# Patient Record
Sex: MALE | Race: WHITE | NOT HISPANIC OR LATINO | Employment: OTHER | URBAN - METROPOLITAN AREA
[De-identification: names, ages, dates, MRNs, and addresses within clinical notes are randomized per-mention and may not be internally consistent; named-entity substitution may affect disease eponyms.]

---

## 2018-01-27 ENCOUNTER — HOSPITAL ENCOUNTER (EMERGENCY)
Facility: HOSPITAL | Age: 73
Discharge: HOME | End: 2018-01-27
Attending: EMERGENCY MEDICINE
Payer: MEDICARE

## 2018-01-27 VITALS
OXYGEN SATURATION: 96 % | RESPIRATION RATE: 16 BRPM | HEART RATE: 89 BPM | SYSTOLIC BLOOD PRESSURE: 184 MMHG | HEIGHT: 67 IN | BODY MASS INDEX: 25.11 KG/M2 | TEMPERATURE: 99 F | WEIGHT: 160 LBS | DIASTOLIC BLOOD PRESSURE: 88 MMHG

## 2018-01-27 DIAGNOSIS — R30.0 DYSURIA: ICD-10-CM

## 2018-01-27 DIAGNOSIS — N40.1 BENIGN PROSTATIC HYPERPLASIA WITH URINARY FREQUENCY: Primary | ICD-10-CM

## 2018-01-27 DIAGNOSIS — R35.0 BENIGN PROSTATIC HYPERPLASIA WITH URINARY FREQUENCY: Primary | ICD-10-CM

## 2018-01-27 LAB
BILIRUB UR QL STRIP: NEGATIVE
CLARITY UR: CLEAR
COLOR UR: YELLOW
GLUCOSE UR QL STRIP: NEGATIVE
HGB UR QL STRIP: ABNORMAL
KETONES UR QL STRIP: NEGATIVE
LEUKOCYTE ESTERASE UR QL STRIP: NEGATIVE
NITRITE UR QL STRIP: NEGATIVE
PH UR STRIP: 6 [PH] (ref 5–8)
PROT UR QL STRIP: ABNORMAL
SP GR UR STRIP: >=1.03 (ref 1–1.03)
URN SPEC COLLECT METH UR: ABNORMAL
UROBILINOGEN UR STRIP-ACNC: NEGATIVE EU/DL

## 2018-01-27 PROCEDURE — 81003 URINALYSIS AUTO W/O SCOPE: CPT

## 2018-01-27 PROCEDURE — 87086 URINE CULTURE/COLONY COUNT: CPT

## 2018-01-27 PROCEDURE — 99283 EMERGENCY DEPT VISIT LOW MDM: CPT

## 2018-01-27 RX ORDER — TAMSULOSIN HYDROCHLORIDE 0.4 MG/1
0.4 CAPSULE ORAL DAILY
Qty: 10 CAPSULE | Refills: 0 | Status: SHIPPED | OUTPATIENT
Start: 2018-01-27 | End: 2018-02-06

## 2018-01-28 NOTE — ED PROVIDER NOTES
Encounter Date: 1/27/2018    SCRIBE #1 NOTE: I, Paramjit Fung, am scribing for, and in the presence of, Dr. Elias.       History     Chief Complaint   Patient presents with    Dysuria     Increased frequency and urgency with associated pain        01/27/2018 8:29 PM     Chief complaint: Dysuria      Kb Link is a 72 y.o. male with a PMHx of BPH who presents to the ED c/o dysuria. He says he had some burning during urination this morning which has persisted during the day. He states he recently had a bladder infection. He denies having any blood in his urine, testicular pain, vomiting, rashes or  abdominal pain. He states he has an enlarged prostate. He reports a subjective fever this morning. He has not other medical problems. He has NKDA.      The history is provided by the patient.     Review of patient's allergies indicates:  No Known Allergies  Past Medical History:   Diagnosis Date    BPH (benign prostatic hyperplasia)      History reviewed. No pertinent surgical history.  History reviewed. No pertinent family history.  Social History   Substance Use Topics    Smoking status: Current Every Day Smoker     Types: Cigars    Smokeless tobacco: Never Used    Alcohol use Yes     Review of Systems   Constitutional: Positive for fever (subjective).   HENT: Negative for sore throat.    Respiratory: Negative for shortness of breath.    Cardiovascular: Negative for chest pain.   Gastrointestinal: Negative for abdominal pain, nausea and vomiting.   Genitourinary: Positive for dysuria. Negative for testicular pain.   Musculoskeletal: Negative for back pain.   Skin: Negative for rash.   Neurological: Negative for weakness.   Hematological: Does not bruise/bleed easily.       Physical Exam     Initial Vitals [01/27/18 2014]   BP Pulse Resp Temp SpO2   (!) 184/88 89 16 98.6 °F (37 °C) 96 %      MAP       120         Physical Exam    Nursing note and vitals reviewed.  Constitutional: He appears well-developed  and well-nourished. He is not diaphoretic. No distress.   HENT:   Head: Normocephalic and atraumatic.   Mouth/Throat: Oropharynx is clear and moist.   Eyes: Conjunctivae are normal.   Neck: Neck supple.   Cardiovascular: Normal rate, regular rhythm, normal heart sounds and intact distal pulses. Exam reveals no gallop and no friction rub.    No murmur heard.  Pulmonary/Chest: Breath sounds normal. He has no wheezes. He has no rhonchi. He has no rales.   Abdominal: Soft. He exhibits no distension. There is no tenderness.   No flank tenderness   Musculoskeletal: Normal range of motion.   Neurological: He is alert and oriented to person, place, and time.   Skin: No rash noted. No erythema.   Psychiatric: He has a normal mood and affect. His behavior is normal. Judgment and thought content normal.         ED Course   Procedures  Labs Reviewed   URINALYSIS - Abnormal; Notable for the following:        Result Value    Specific Gravity, UA >=1.030 (*)     Protein, UA Trace (*)     Occult Blood UA Trace (*)     All other components within normal limits   CULTURE, URINE             Medical Decision Making:   History:   Old Medical Records: I decided to obtain old medical records.  Clinical Tests:   Lab Tests: Ordered and Reviewed            Scribe Attestation:   Scribe #1: I performed the above scribed service and the documentation accurately describes the services I performed. I attest to the accuracy of the note.    I, Dr. eNri Elias, personally performed the services described in this documentation. All medical record entries made by the scribe were at my direction and in my presence.  I have reviewed the chart and agree that the record reflects my personal performance and is accurate and complete. Neri Elias MD.  3:12 AM 01/28/2018    Kb Link is a 72 y.o. male presenting with urinary symptoms of frequency and urgency with some slight dysuria with urination as well.  Urinalysis is not suggestive  of infection.  He has no fever, flank pain, or emesis.  I doubt pyelonephritis.  Less likely acute cystitis with symptoms related to BPH considered.  Urine culture sent.  Patient was initiated on Flomax for close urology follow-up recommended.  I do not think other labs are indicated.  Very low suspicion for urinary retention.  No abdominal distention or tenderness to palpation here.  Return precautions reviewed.        ED Course      Clinical Impression:   The primary encounter diagnosis was Benign prostatic hyperplasia with urinary frequency. A diagnosis of Dysuria was also pertinent to this visit.                           Neri Elias MD  01/28/18 0313

## 2018-01-30 LAB — BACTERIA UR CULT: NORMAL

## 2018-10-25 ENCOUNTER — APPOINTMENT (EMERGENCY)
Dept: RADIOLOGY | Facility: HOSPITAL | Age: 73
End: 2018-10-25
Attending: EMERGENCY MEDICINE
Payer: MEDICARE

## 2018-10-25 ENCOUNTER — HOSPITAL ENCOUNTER (EMERGENCY)
Facility: HOSPITAL | Age: 73
Discharge: HOME | End: 2018-10-25
Attending: EMERGENCY MEDICINE
Payer: MEDICARE

## 2018-10-25 VITALS
BODY MASS INDEX: 25.11 KG/M2 | TEMPERATURE: 98.4 F | OXYGEN SATURATION: 96 % | HEART RATE: 69 BPM | DIASTOLIC BLOOD PRESSURE: 75 MMHG | RESPIRATION RATE: 16 BRPM | WEIGHT: 160 LBS | HEIGHT: 67 IN | SYSTOLIC BLOOD PRESSURE: 138 MMHG

## 2018-10-25 DIAGNOSIS — N30.01 ACUTE CYSTITIS WITH HEMATURIA: Primary | ICD-10-CM

## 2018-10-25 LAB
ANION GAP SERPL CALC-SCNC: 9 MEQ/L (ref 3–15)
BACTERIA URNS QL MICRO: ABNORMAL /HPF
BASOPHILS # BLD: 0.04 K/UL (ref 0.01–0.1)
BASOPHILS NFR BLD: 0.5 %
BILIRUB UR QL STRIP.AUTO: ABNORMAL MG/DL
BUN SERPL-MCNC: 28 MG/DL (ref 8–20)
CALCIUM SERPL-MCNC: 9.2 MG/DL (ref 8.9–10.3)
CHLORIDE SERPL-SCNC: 107 MEQ/L (ref 98–109)
CLARITY UR REFRACT.AUTO: ABNORMAL
CO2 SERPL-SCNC: 25 MEQ/L (ref 22–32)
COLOR UR AUTO: ABNORMAL
CREAT SERPL-MCNC: 1 MG/DL (ref 0.8–1.3)
DIFFERENTIAL METHOD BLD: ABNORMAL
EOSINOPHIL # BLD: 0.08 K/UL (ref 0.04–0.54)
EOSINOPHIL NFR BLD: 0.9 %
ERYTHROCYTE [DISTWIDTH] IN BLOOD BY AUTOMATED COUNT: 12.4 % (ref 11.6–14.4)
GFR SERPL CREATININE-BSD FRML MDRD: >60 ML/MIN/1.73M*2
GLUCOSE SERPL-MCNC: 107 MG/DL (ref 70–99)
GLUCOSE UR STRIP.AUTO-MCNC: ABNORMAL MG/DL
HCT VFR BLDCO AUTO: 38.8 % (ref 40.1–51)
HGB BLD-MCNC: 13 G/DL (ref 13.7–17.5)
HGB UR QL STRIP.AUTO: 3
HYALINE CASTS #/AREA URNS LPF: ABNORMAL /LPF
IMM GRANULOCYTES # BLD AUTO: 0.03 K/UL (ref 0–0.08)
IMM GRANULOCYTES NFR BLD AUTO: 0.3 %
KETONES UR STRIP.AUTO-MCNC: ABNORMAL MG/DL
LEUKOCYTE ESTERASE UR QL STRIP.AUTO: 2
LYMPHOCYTES # BLD: 1.46 K/UL (ref 1.2–3.5)
LYMPHOCYTES NFR BLD: 16.8 %
MCH RBC QN AUTO: 31.3 PG (ref 28–33.2)
MCHC RBC AUTO-ENTMCNC: 33.5 G/DL (ref 32.2–36.5)
MCV RBC AUTO: 93.3 FL (ref 83–98)
MONOCYTES # BLD: 0.56 K/UL (ref 0.3–1)
MONOCYTES NFR BLD: 6.4 %
NEUTROPHILS # BLD: 6.54 K/UL (ref 1.7–7)
NEUTS SEG NFR BLD: 75.1 %
NITRITE UR QL STRIP.AUTO: ABNORMAL
NRBC BLD-RTO: 0.1 %
PDW BLD AUTO: 8.3 FL (ref 9.4–12.4)
PH UR STRIP.AUTO: ABNORMAL [PH]
PLATELET # BLD AUTO: 378 K/UL (ref 150–350)
POTASSIUM SERPL-SCNC: 3.7 MEQ/L (ref 3.6–5.1)
PROT UR QL STRIP.AUTO: ABNORMAL
RBC # BLD AUTO: 4.16 M/UL (ref 4.5–5.8)
RBC #/AREA URNS HPF: ABNORMAL /HPF
SODIUM SERPL-SCNC: 141 MEQ/L (ref 136–144)
SP GR UR REFRACT.AUTO: 1.02
SQUAMOUS URNS QL MICRO: 1 /HPF
UROBILINOGEN UR STRIP-ACNC: ABNORMAL EU/DL
WBC # BLD AUTO: 8.71 K/UL (ref 3.8–10.5)
WBC #/AREA URNS HPF: ABNORMAL /HPF

## 2018-10-25 PROCEDURE — 96365 THER/PROPH/DIAG IV INF INIT: CPT

## 2018-10-25 PROCEDURE — 85025 COMPLETE CBC W/AUTO DIFF WBC: CPT | Performed by: PHYSICIAN ASSISTANT

## 2018-10-25 PROCEDURE — 96361 HYDRATE IV INFUSION ADD-ON: CPT

## 2018-10-25 PROCEDURE — 81003 URINALYSIS AUTO W/O SCOPE: CPT | Performed by: PHYSICIAN ASSISTANT

## 2018-10-25 PROCEDURE — 87040 BLOOD CULTURE FOR BACTERIA: CPT | Performed by: PHYSICIAN ASSISTANT

## 2018-10-25 PROCEDURE — 63600000 HC DRUGS/DETAIL CODE: Performed by: PHYSICIAN ASSISTANT

## 2018-10-25 PROCEDURE — 3E0337Z INTRODUCTION OF ELECTROLYTIC AND WATER BALANCE SUBSTANCE INTO PERIPHERAL VEIN, PERCUTANEOUS APPROACH: ICD-10-PCS | Performed by: EMERGENCY MEDICINE

## 2018-10-25 PROCEDURE — 74176 CT ABD & PELVIS W/O CONTRAST: CPT

## 2018-10-25 PROCEDURE — 80048 BASIC METABOLIC PNL TOTAL CA: CPT | Mod: 59 | Performed by: PHYSICIAN ASSISTANT

## 2018-10-25 PROCEDURE — 25800000 HC PHARMACY IV SOLUTIONS: Performed by: EMERGENCY MEDICINE

## 2018-10-25 PROCEDURE — 36415 COLL VENOUS BLD VENIPUNCTURE: CPT | Performed by: PHYSICIAN ASSISTANT

## 2018-10-25 PROCEDURE — 99284 EMERGENCY DEPT VISIT MOD MDM: CPT | Mod: 25

## 2018-10-25 PROCEDURE — 87086 URINE CULTURE/COLONY COUNT: CPT | Performed by: PHYSICIAN ASSISTANT

## 2018-10-25 PROCEDURE — 3E03329 INTRODUCTION OF OTHER ANTI-INFECTIVE INTO PERIPHERAL VEIN, PERCUTANEOUS APPROACH: ICD-10-PCS | Performed by: EMERGENCY MEDICINE

## 2018-10-25 RX ORDER — CEFUROXIME AXETIL 500 MG/1
500 TABLET ORAL 2 TIMES DAILY
Qty: 14 TABLET | Refills: 0 | Status: SHIPPED | OUTPATIENT
Start: 2018-10-25 | End: 2018-11-01

## 2018-10-25 RX ORDER — SODIUM CHLORIDE 9 MG/ML
125 INJECTION, SOLUTION INTRAVENOUS CONTINUOUS
Status: DISCONTINUED | OUTPATIENT
Start: 2018-10-25 | End: 2018-10-25 | Stop reason: HOSPADM

## 2018-10-25 RX ORDER — FINASTERIDE 5 MG/1
5 TABLET, FILM COATED ORAL DAILY
Qty: 10 TABLET | Refills: 0 | Status: SHIPPED | OUTPATIENT
Start: 2018-10-25 | End: 2024-01-23

## 2018-10-25 RX ADMIN — SODIUM CHLORIDE 125 ML/HR: 9 INJECTION, SOLUTION INTRAVENOUS at 17:35

## 2018-10-25 RX ADMIN — CEFTRIAXONE SODIUM 1 G: 1 INJECTION, POWDER, FOR SOLUTION INTRAVENOUS at 19:58

## 2018-10-25 RX ADMIN — SODIUM CHLORIDE 1000 ML: 9 INJECTION, SOLUTION INTRAVENOUS at 17:35

## 2018-10-25 ASSESSMENT — ENCOUNTER SYMPTOMS
DIZZINESS: 0
CHEST TIGHTNESS: 0
FEVER: 0
ABDOMINAL PAIN: 1
VOMITING: 0
NAUSEA: 0
DYSURIA: 0
HEMATURIA: 1
SHORTNESS OF BREATH: 0
LIGHT-HEADEDNESS: 0

## 2018-10-25 NOTE — DISCHARGE INSTRUCTIONS
Take antibiotic as scheduled  Follow up with Dr. Arriaga as scheduled  Return for fever, inability to urinate or any other concerning symptoms

## 2018-10-25 NOTE — ED PROVIDER NOTES
HPI     Chief Complaint   Patient presents with   • Blood in Urine         History provided by:  Patient  Blood in Urine   This is a new problem. The current episode started 1 to 4 weeks ago. The problem has been gradually worsening since onset. He describes the hematuria as gross hematuria. The hematuria occurs throughout his entire urinary stream. Pain scale: Pt reports constant mild lower abdominal pressure. Mild R flank pain while voiding. The pain is mild. He describes his urine color as dark red (with intermittent clots). Obstructive symptoms do not include incomplete emptying. Associated symptoms include abdominal pain. Pertinent negatives include no dysuria, fever, inability to urinate, nausea or vomiting. His past medical history is significant for BPH. There is no history of kidney stones.        Patient History     Past Medical History:   Diagnosis Date   • BPH (benign prostatic hyperplasia)        Past Surgical History:   Procedure Laterality Date   • HAND SURGERY         History reviewed. No pertinent family history.    Social History   Substance Use Topics   • Smoking status: Current Every Day Smoker     Types: Cigars   • Smokeless tobacco: Not on file   • Alcohol use Yes      Comment: 2 glasses of wine/ day       Systems Reviewed from Nursing Triage:  Allergies  Meds  Problems          Review of Systems     Review of Systems   Constitutional: Negative for fever.   Respiratory: Negative for chest tightness and shortness of breath.    Cardiovascular: Negative for chest pain.   Gastrointestinal: Positive for abdominal pain. Negative for nausea and vomiting.   Genitourinary: Positive for hematuria. Negative for dysuria and incomplete emptying.   Neurological: Negative for dizziness and light-headedness.        Physical Exam     ED Triage Vitals   Temp Heart Rate Resp BP SpO2   10/25/18 1542 10/25/18 1542 10/25/18 1542 10/25/18 1542 10/25/18 1542   37 °C (98.6 °F) 77 18 (!) 147/79 98 %      Temp Source  "Heart Rate Source Patient Position BP Location FiO2 (%) (Set)   10/25/18 1542 -- 10/25/18 1736 10/25/18 1736 --   Tympanic  Lying Right upper arm        Pulse Ox %: 98 % (10/25/18 1629)  Pulse Ox Interpretation: Normal (10/25/18 1629)  Heart Rate: 78 (10/25/18 1629)  Rhythm Strip Interpretation: Normal Sinus Rhythm (10/25/18 1629)    Patient Vitals for the past 24 hrs:   BP Temp Temp src Pulse Resp SpO2 Height Weight   10/25/18 1736 (!) 164/81 - - 63 16 96 % 1.702 m (5' 7\") 72.6 kg (160 lb)   10/25/18 1542 (!) 147/79 37 °C (98.6 °F) Tympanic 77 18 98 % - -           Physical Exam   Constitutional: He is oriented to person, place, and time. He appears well-developed and well-nourished. No distress.   Neck: Neck supple.   Cardiovascular: Normal rate and regular rhythm.    Pulmonary/Chest: Effort normal and breath sounds normal.   Abdominal: Soft. There is no tenderness. There is no CVA tenderness.   Neurological: He is alert and oriented to person, place, and time.   Nursing note and vitals reviewed.           Procedures    ED Course & MDM     Labs Reviewed   BASIC METABOLIC PANEL - Abnormal        Result Value    Sodium 141      Potassium 3.7      Chloride 107      CO2 25      BUN 28 (*)     Creatinine 1.0      Glucose 107 (*)     Calcium 9.2      eGFR >60.0      Anion Gap 9     CBC - Abnormal     WBC 8.71      RBC 4.16 (*)     Hemoglobin 13.0 (*)     Hematocrit 38.8 (*)     MCV 93.3      MCH 31.3      MCHC 33.5      RDW 12.4      Platelets 378 (*)     MPV 8.3 (*)    DIFF COUNT - Abnormal     Differential Type Auto      nRBC 0.1 (*)     Immature Granulocytes 0.3      Neutrophils 75.1      Lymphocytes 16.8      Monocytes 6.4      Eosinophils 0.9      Basophils 0.5      Immature Granulocytes, Absolute 0.03      Neutrophils, Absolute 6.54      Lymphocytes, Absolute 1.46      Monocytes, Absolute 0.56      Eosinophils, Absolute 0.08      Basophils, Absolute 0.04     UA REFLEX CULTURE (MACROSCOPIC) - Abnormal     Color, " Urine Red (*)     Clarity, Urine Turbid (*)     Specific Gravity, Urine 1.021      pH, Urine   (*)     Value: Proper identification not possible due to color interference.    Leukocyte Esterase +2 (*)     Nitrite, Urine   (*)     Value: Proper identification not possible due to color interference.    Protein, Urine   (*)     Value: Proper identification not possible due to color interference.    Glucose, Urine   (*)     Value: Proper identification not possible due to color interference.    Ketones, Urine   (*)     Value: Proper identification not possible due to color interference.    Urobilinogen, Urine   (*)     Value: Proper identification not possible due to color interference.    Bilirubin, Urine   (*)     Value: Proper identification not possible due to color interference.    Blood, Urine +3 (*)    UA MICROSCOPIC - Abnormal     Squamous Epithelial +1 (*)     Hyaline Cast 0 TO 2 (*)     Bacteria, Urine None Seen      RBC, Urine 20 TO 35 (*)     WBC, Urine Too Numerous To Count (*)    URINE CULTURE   BLOOD CULTURE    Narrative:     The following orders were created for panel order Blood culture.  Procedure                               Abnormality         Status                     ---------                               -----------         ------                     Blood Culture[[id number]]                                     In process                   Please view results for these tests on the individual orders.   BLOOD CULTURE    Narrative:     The following orders were created for panel order Blood culture.  Procedure                               Abnormality         Status                     ---------                               -----------         ------                     Blood Culture[[id number]]                                     In process                   Please view results for these tests on the individual orders.   BLOOD CULTURE   BLOOD CULTURE   CBC AND DIFFERENTIAL    Narrative:     The  following orders were created for panel order CBC and differential.  Procedure                               Abnormality         Status                     ---------                               -----------         ------                     CBC[[id number]]                           Abnormal            Final result               Diff Count[[id number]]                    Abnormal            Final result                 Please view results for these tests on the individual orders.   URINALYSIS REFLEX CULTURE    Narrative:     The following orders were created for panel order Urinalysis w/ reflex culture.  Procedure                               Abnormality         Status                     ---------                               -----------         ------                     UA Reflex to Culture (Mac...[[id number]]  Abnormal            Final result               UA Microscopic[[id number]]                Abnormal            Final result                 Please view results for these tests on the individual orders.       CT ABDOMEN PELVIS WITHOUT IV CONTRAST   Final Result   IMPRESSION:   1. Moderate right-sided hydroureteronephrosis due to a masslike density in the   bladder which appears to extend into the distal right ureter.  It is uncertain   if this is a bladder mass or a prostate mass extending into the bladder.      2. No radiopaque calculi in the urinary tract              MDM         ED Course as of Oct 25 2010   Thu Oct 25, 2018   1624 Pt referred by urologist honey Krause for plan  [NH]   1642 Post void 41 cc  []   1645 Dr. Rowan d/w Dr. Arriaga-agree's with plan for labs and CT to r/u kidney stone  [NH]   1859 D/w Dr Tineo, he req have resident to see pt  [GH]   1901 CT scan c/w obstructing mass. UA also concerning for UTI. Dr. Rowan d/w Dr. Mchugh recommends urology resident consult  [NH]   1929 Urology [facility], ok to d/c  []      ED Course User Index  [GH] Louis Rowan W, DO  [NH] Tri Kirkland, PA  C         Clinical Impressions as of Oct 25 2010   Acute cystitis with hematuria   Mass     Disposition: Discharged     Tri Kirkland PA C  10/25/18 2010

## 2018-10-26 LAB — BACTERIA UR CULT: NORMAL

## 2018-10-26 NOTE — ED ATTESTATION NOTE
I have personally seen and examined the patient.  I reviewed and agree with physician assistant / nurse practitioner’s assessment and plan of care, with the following exceptions: None  My examination, assessment, and plan of care of Tam Zuñiga is as follows:       Hematuria for 2 weeks  Intermittent R back pain   abd soft non tender  Hematuria  abd mass  D/c to f/up with urology     Louis Rowan, DO  10/25/18 2036

## 2018-10-26 NOTE — CONSULTS
UROLOGY CONSULT     Subjective   Tam Zuñiga is a 72 y.o. male  With hx of BPH followed by Dr. Arriaga who presents with 1 week of intermittent gross hematuria with small clots and intermittent right flank pain.     Patient reports painless gross hematuria but denies incomplete emptying, urgency and/or frequency. Baseline reports weak stream but denies straining. No incomplete emptying. Mild dysuria. Also reports intermittent right flank pain with voiding which has been present for several months.     No fevers, chills, nausea/vomiting. In ED CT A/P shows mild right hydro with severely enlarged prostate.     Past Medical History:   Diagnosis Date   • BPH (benign prostatic hyperplasia)        Past Surgical History:   Procedure Laterality Date   • HAND SURGERY         No Known Allergies      Current Facility-Administered Medications:   •  cefTRIAXone (ROCEPHIN) IVPB 1 g in 100 mL NSS vial in bag, 1 g, intravenous, Once, Tri Kirkland PA C, Last Rate: 200 mL/hr at 10/25/18 1958, 1 g at 10/25/18 1958  •  sodium chloride 0.9 % infusion, 125 mL/hr, intravenous, Continuous, Louis Rowan, DO, Last Rate: 125 mL/hr at 10/25/18 1735, 125 mL/hr at 10/25/18 1735    Current Outpatient Prescriptions:   •  cefuroxime (CEFTIN) 500 mg tablet, Take 1 tablet (500 mg total) by mouth 2 (two) times a day for 7 days., Disp: 14 tablet, Rfl: 0  •  finasteride (PROSCAR) 5 mg tablet, Take 1 tablet (5 mg total) by mouth daily for 10 days., Disp: 10 tablet, Rfl: 0    Social History     Social History   • Marital status: Single     Spouse name: N/A   • Number of children: N/A   • Years of education: N/A     Occupational History   • Not on file.     Social History Main Topics   • Smoking status: Current Every Day Smoker     Types: Cigars   • Smokeless tobacco: Not on file   • Alcohol use Yes      Comment: 2 glasses of wine/ day   • Drug use: No   • Sexual activity: Not on file     Other Topics Concern   • Not on file     Social History  "Narrative   • No narrative on file       History reviewed. No pertinent family history.      Review of Systems  Gen: No fevers, chills, or night sweats   Pulm: No SOB or cough  CV: No chest pain or palpitations  GI: No nausea/vomiting, no constipation or diarrhea  : per HPI  Neuro: no dizziness/lightheadedness  Msk: no back pain or muscle aches   Psych: no depression/anxiety        Objective     Vitals:    10/25/18 1542 10/25/18 1736   BP: (!) 147/79 (!) 164/81   BP Location:  Right upper arm   Patient Position:  Lying   Pulse: 77 63   Resp: 18 16   Temp: 37 °C (98.6 °F)    TempSrc: Tympanic    SpO2: 98% 96%   Weight:  72.6 kg (160 lb)   Height:  1.702 m (5' 7\")       No intake/output data recorded.  I/O this shift:  In: 1000 [IV Piggyback:1000]  Out: -       Physicial Exam  GEN: NAD  HEENT: normocephalic, atraumatic   PULM: unlabored resp  CARDIAC: regular rate   ABD: soft, non-tender, non-distended   : circumcised, CHANTE - >100g prostate, Rt>LT. No nodules. Tea colored urine in urinal. No CVAT BL  EXT: No LE edema  NEURO: AAOX3      Labs  CBC Results       10/25/18                          1640           WBC 8.71           RBC 4.16 (L)           HGB 13.0 (L)           HCT 38.8 (L)           MCV 93.3           MCH 31.3           MCHC 33.5            (H)                         BMP Results       10/25/18                          1640                      K 3.7           Cl 107           CO2 25           Glucose 107 (H)           BUN 28 (H)           Creatinine 1.0           Calcium 9.2           Anion Gap 9           EGFR &gt;60.0           Comment for K at 1640 on 10/25/18:    Results obtained on plasma. Plasma Potassium values may be up to 0.4 mEQ/L less than serum values. The differences may be greater for patients with high platelet or white cell counts.        UA Results       10/25/18                          1640           Color Red (A)           Clarity Turbid (A)           Glucose Proper " identification not possible due to color interference. (A)           Bilirubin Proper identification not possible due to color interference. (A)           Ketones Proper identification not possible due to color interference. (A)           Sp Grav 1.021           Blood +3 (A)           Ph Proper identification not possible due to color interference. (A)           Protein Proper identification not possible due to color interference. (A)           Urobilinogen Proper identification not possible due to color interference. (A)           Nitrite Proper identification not possible due to color interference. (A)           Leuk Est +2 (A)           WBC Too Numerous To Count (A)           RBC 20 TO 35 (A)           Bacteria None Seen           Comment for Blood at 1640 on 10/25/18:    The sensitivity of the occult blood test is equivalent to approximately 4 intact RBC/HPF.            Imaging  CT DOSE:  One or more dose reduction techniques (e.g. automated exposure  control, adjustment of the mA and/or kV according to patient size, use of  iterative reconstruction technique) utilized for this examination.     No prior studies are available for comparison.     Lung bases: Atelectasis     Liver: There is an incidental note made of liver lesions measuring less  than/equal to 0.5 cm.  There are scattered cysts in the liver.     Gallbladder: Unremarkable     Spleen: Unremarkable     Pancreas: Unremarkable     Adrenal glands: Unremarkable     Kidneys: There is moderate right-sided hydroureteronephrosis throughout.  No  definite calculi are seen in the kidneys, ureters, or bladder.  There is  masslike opacity within the bladder which appears to extend into the right  ureter this masslike opacity appears to be continuous with the prostate which is  enlarged.  There is mild diffuse bladder wall thickening.     Bowel: The nonopacified bowel is without evidence of obstruction.  There are  scattered diverticula in the colon without  evidence of acute diverticulitis.  Appendix is visualized and is normal in appearance.     Ascites: None     Osseous structures: There are degenerative changes in the spine most pronounced  at L5-S1.     --  IMPRESSION:  1. Moderate right-sided hydroureteronephrosis due to a masslike density in the  bladder which appears to extend into the distal right ureter.  It is uncertain  if this is a bladder mass or a prostate mass extending into the bladder.     2. No radiopaque calculi in the urinary tract    Assessment/Plan  Assessment   72 y.o. male with  BPH and recurrent gross hematuria - likely due to BPH but cannot rule out bladder lesion    -Start prosca 5mg qday  -Needs cysto. Pr patient scheduled for 11/7 with Dr. Arriaga. If recurrent GH and mild right hydro due to BPH than will likely need TURP or more likely simple prostatectomy given size of prostate.   -Ok to dc home. Discussed with ED, patient, and Dr. Tineo.      Allison Dye MD PGY-5   [facility]  Pager# [phone]

## 2018-10-30 LAB
BACTERIA BLD CULT: NORMAL
BACTERIA BLD CULT: NORMAL

## 2019-01-30 ENCOUNTER — TRANSCRIBE ORDERS (OUTPATIENT)
Dept: SCHEDULING | Age: 74
End: 2019-01-30

## 2019-01-30 DIAGNOSIS — N13.30 HYDRONEPHROSIS: Primary | ICD-10-CM

## 2019-04-17 ENCOUNTER — HOSPITAL ENCOUNTER (OUTPATIENT)
Dept: RADIOLOGY | Age: 74
Discharge: HOME | End: 2019-04-17
Attending: UROLOGY
Payer: MEDICARE

## 2019-04-17 ENCOUNTER — TRANSCRIBE ORDERS (OUTPATIENT)
Dept: RADIOLOGY | Age: 74
End: 2019-04-17

## 2019-04-17 DIAGNOSIS — N13.30 HYDRONEPHROSIS: ICD-10-CM

## 2019-04-17 PROCEDURE — 76775 US EXAM ABDO BACK WALL LIM: CPT

## 2019-11-18 ENCOUNTER — TRANSCRIBE ORDERS (OUTPATIENT)
Dept: SCHEDULING | Age: 74
End: 2019-11-18

## 2019-11-18 DIAGNOSIS — N13.30 UNSPECIFIED HYDRONEPHROSIS: Primary | ICD-10-CM

## 2019-12-09 ENCOUNTER — HOSPITAL ENCOUNTER (OUTPATIENT)
Dept: RADIOLOGY | Age: 74
Discharge: HOME | End: 2019-12-09
Attending: UROLOGY
Payer: MEDICARE

## 2019-12-09 DIAGNOSIS — N13.30 UNSPECIFIED HYDRONEPHROSIS: ICD-10-CM

## 2019-12-09 PROCEDURE — 76775 US EXAM ABDO BACK WALL LIM: CPT

## 2020-08-31 NOTE — ED NOTES
Given written and verbal DC instructions questions answered per MD aware to follow up with PCP encouraged to return if needed. Given RX with teaching.    Never smoker

## 2023-10-27 ENCOUNTER — TRANSCRIBE ORDERS (OUTPATIENT)
Dept: SCHEDULING | Age: 78
End: 2023-10-27

## 2023-10-27 DIAGNOSIS — R31.0 GROSS HEMATURIA: Primary | ICD-10-CM

## 2023-11-14 ENCOUNTER — HOSPITAL ENCOUNTER (OUTPATIENT)
Dept: RADIOLOGY | Age: 78
Discharge: HOME | End: 2023-11-14
Attending: UROLOGY
Payer: COMMERCIAL

## 2023-11-14 DIAGNOSIS — R31.0 GROSS HEMATURIA: ICD-10-CM

## 2023-11-14 PROCEDURE — 74178 CT ABD&PLV WO CNTR FLWD CNTR: CPT

## 2023-11-14 PROCEDURE — 63600105 HC IODINE BASED CONTRAST: Mod: JZ | Performed by: UROLOGY

## 2023-11-14 RX ADMIN — IOHEXOL 100 ML: 350 INJECTION, SOLUTION INTRAVENOUS at 13:13

## 2024-01-19 ENCOUNTER — APPOINTMENT (OUTPATIENT)
Dept: PREADMISSION TESTING | Facility: HOSPITAL | Age: 79
End: 2024-01-19
Payer: COMMERCIAL

## 2024-01-19 VITALS — HEIGHT: 67 IN | BODY MASS INDEX: 25.9 KG/M2 | WEIGHT: 165 LBS

## 2024-01-19 NOTE — PRE-PROCEDURE INSTRUCTIONS
1. Admissions will call you with your arrival time on 1/22/24 (day prior to surgery) between 2pm-4pm.  For questions about your arrival time, please call [phone].     2. On the day of your procedure please report to the [facility] in the [place]. Please arrive through the [facility]([address], [place])  If you are parking in the [facility] Parking Garage, come to the ground floor of the garage and follow signs to the [facility].  Please report to the Surgery Registration Desk located in the [facility].      3. Please follow the following fasting guidelines: No solid food EIGHT HOURS prior to arrival time. Unlimited clear liquids, meaning water or PLAIN black coffee WITHOUT any milk, cream, sugar, or sweetener are permitted up to TWO HOURS prior to arrival at the hospital.    4. Please take ONLY the following medications with a sip of water on the morning of your procedure: none    5. Other Instructions: You may brush your teeth the morning of the procedure. Rinse and spit, do not swallow.  Bring a list of your medications with dosages.  Use surgical wash as directed.     6. If you develop a cold, cough, fever, rash, or other symptom prior to the day of the procedure, please report it to your physician immediately.    7. If you need to cancel the procedure for any reason, please contact your surgeon.    8. Make arrangements to have safe transportation home accompanied by a responsible adult. If you have not arranged safe transportation home, your surgery will be cancelled. Safe transportation may include private vehicle, ride-share service, taxi and public transportation when accompanied by a responsible adult who will assist you home. A responsible adult is someone known to you and does not include the taxi, ride-share or public transit drive transporting you.    9.  If it is medically necessary for you to have a longer stay, you will be informed as soon as the  decision is made.    10. Only bring essential items to the hospital.  Do not wear or bring anything of value to the hospital including jewelry of any kind, money, or wallet. Do not wear make-up or contact lenses.  DO NOT BRING MEDICATIONS FROM HOME unless instructed to do so. DO bring your hearing aids, glasses, and a case    11. No lotion, creams, powders, or oils on skin the morning of procedure     12. Dress in comfortable clothes.    13.  If instructed, please bring a copy of your Advanced Directive (Living Will/Durable Power of ) on the day of your procedure.     14. Ensuring your safety at all times is a very important part of our [facility] Culture of Safety. After having surgery and sedation, you are at risk for falling and balance issues. Although you may feel awake, the effects of the medication can last up to 24 hours after anesthesia. If you need to use the bathroom during your recovery period, nursing staff will escort you there and stay with you to ensure your safety.    15. Refrain from drinking alcohol and smoking cigarettes for 24 hours prior to surgery.    16. Shower with antibacterial soap (Dial) the night before and morning of your procedure.  If your procedure indicates the need for CHG antiseptic wash (Bactoshield or Hibiclens), please use this instead and follow instructions as discussed at the time of your Pre-Admission Testing phone interview or visit.    Above instructions reviewed with patient and patient acknowledges understanding.      How to Use an Incentive Spirometer  An incentive spirometer is a tool that measures how well you are filling your lungs with each breath. Learning to take long, deep breaths using this tool can help you keep your lungs clear and active. This may help to reverse or lessen your chance of developing breathing (pulmonary) problems, especially infection. You may be asked to use a spirometer:  • After a surgery.  • If you have a lung problem or a history of  smoking.  • After a long period of time when you have been unable to move or be active.  If the spirometer includes an indicator to show the highest number that you have reached, your health care provider or respiratory therapist will help youset a goal. Keep a log of your progress as told by your health care provider.  What are the risks?  • Breathing too quickly may cause dizziness or cause you to pass out. Take your time so you do not get dizzy or light-headed.  • If you are in pain, you may need to take pain medicine before doing incentive spirometry. It is harder to take a deep breath if you are having pain.  How to use your incentive spirometer    1. Sit up on the edge of your bed or on a chair.  2. Hold the incentive spirometer so that it is in an upright position.  3. Before you use the spirometer, breathe out normally.  4. Place the mouthpiece in your mouth. Make sure your lips are closed tightly around it.  5. Breathe in slowly and as deeply as you can through your mouth, causing the piston or the ball to rise toward the top of the chamber.  6. Hold your breath for 3-5 seconds, or for as long as possible.  ? If the spirometer includes a  indicator, use this to guide you in breathing. Slow down your breathing if the indicator goes above the marked areas.  7. Remove the mouthpiece from your mouth and breathe out normally. The piston or ball will return to the bottom of the chamber.  8. Rest for a few seconds, then repeat the steps 10 or more times.  ? Take your time and take a few normal breaths between deep breaths so that you do not get dizzy or light-headed.  ? Do this every 1-2 hours when you are awake.  9. If the spirometer includes a goal marker to show the highest number you have reached (best effort), use this as a goal to work toward during each repetition.  10. After each set of 10 deep breaths, cough a few times. This will help to make sure that your lungs are clear.  ? If you have an  incision on your chest or abdomen from surgery, place a pillow or a rolled-up towel firmly against the incision when you cough. This can help to reduce pain while taking deep breaths and coughing.  General tips  1. When you are able to get out of bed:  ? Walk around often.  ? Continue to take deep breaths and cough in order to clear your lungs.  2. Keep using the incentive spirometer until your health care provider says it is okay to stop using it. If you have been in the hospital, you may be told to keep using the spirometer at home.  Contact a health care provider if:  • You are having difficulty using the spirometer.  • You have trouble using the spirometer as often as instructed.  • Your pain medicine is not giving enough relief for you to use the spirometer as told.  • You have a fever.  Get help right away if:  • You develop shortness of breath.  • You develop a cough with bloody mucus from the lungs.  • You have fluid or blood coming from an incision site after you cough.  Summary  • An incentive spirometer is a tool that can help you learn to take long, deep breaths to keep your lungs clear and active.  • You may be asked to use a spirometer after a surgery, if you have a lung problem or a history of smoking, or if you have been inactive for a long period of time.  • Use your incentive spirometer as instructed every 1-2 hours while you are awake.  • If you have an incision on your chest or abdomen, place a pillow or a rolled-up towel firmly against your incision when you cough. This will help to reduce pain.  • Get help right away if you have shortness of breath, you cough up bloody mucus, or blood comes from your incision when you cough.  This information is not intended to replace advice given to you by your health care provider. Make sure you discuss any questions you have with your healthcare provider.  Document Revised: 03/08/2021 Document Reviewed: 03/08/2021  Elsevier Patient Education © 2022 [employer]  [employer].        Above instructions reviewed with patient and patient acknowledges understanding.    Form explained by: Aaliyah Rascon RN

## 2024-01-22 ENCOUNTER — ANESTHESIA EVENT (OUTPATIENT)
Dept: OPERATING ROOM | Facility: HOSPITAL | Age: 79
Setting detail: HOSPITAL OUTPATIENT SURGERY
End: 2024-01-22
Payer: COMMERCIAL

## 2024-01-22 NOTE — ANESTHESIA PREPROCEDURE EVALUATION
Relevant Problems   No relevant active problems       Anesthesia ROS/MED HX      Pulmonary    Pneumonia   PE  Cardiovascular   hypertension   Cardiac clearance reviewed and ECG reviewed  Hematological    anticoagulants   anemia  Endo/Other  History of cancer and prostate cancer       Past Surgical History:   Procedure Laterality Date   • COLONOSCOPY     • HAND SURGERY     • PROSTATE SURGERY         Physical Exam    Airway   Mallampati: II   TM distance: >3 FB   Neck ROM: full  Cardiovascular - normal   Rhythm: regular   Rate: normalPulmonary - normal   clear to auscultation  Dental - normal        Anesthesia Plan    Plan: general    Technique: general endotracheal     Lines and Monitors: PIV     Airway: oral intubation       patient did not smoke on day of surgery   2 ASA  Blood Products:     Use of Blood Products Discussed: Yes   Anesthetic plan and risks discussed with: patient  Induction:    intravenous   Postop Plan:   Patient Disposition: phase II then home   Pain Management: IV analgesics  Comments:    Plan: Discussed risks and benefits of general anesthesia with patient. Risks discussed including but not limited to sore throat, injury to lips,teeth and mouth. Discussed possibility of additional IV access, arterial line placement, central line placement, and remaining intubated post-operatively. All questions answered and patient agrees to proceed.     There is no problem list on file for this patient.      No current facility-administered medications for this encounter.       Prior to Admission medications    Medication Sig Start Date End Date Taking? Authorizing Provider   finasteride (PROSCAR) 5 mg tablet Take 1 tablet (5 mg total) by mouth daily for 10 days. 10/25/18 11/9/23  Louis Rowan, DO   tamsulosin HCl (TAMSULOSIN ORAL) daily. 6/10/23   Provider, Xiomy Soliz MD       CBC Results       10/25/18     1640    WBC 8.71    RBC 4.16    HGB 13.0    HCT 38.8    MCV 93.3    MCH 31.3    MCHC 33.5     "          BMP Results       10/25/18     1640        K 3.7    Cl 107    CO2 25    Glucose 107    BUN 28    Creatinine 1.0    Calcium 9.2    Anion Gap 9    EGFR >60.0         Comment for K at 1640 on 10/25/18:   Results obtained on plasma. Plasma Potassium values may be up to 0.4 mEQ/L less than serum values. The differences may be greater for patients with high platelet or white cell counts.          No results found for: \"HCGPREGUR\", \"PREGSERUM\", \"HCG\", \"HCGQUANT\"          No orders to display       "

## 2024-01-23 ENCOUNTER — HOSPITAL ENCOUNTER (OUTPATIENT)
Facility: HOSPITAL | Age: 79
Discharge: HOME | End: 2024-01-24
Attending: UROLOGY | Admitting: UROLOGY
Payer: COMMERCIAL

## 2024-01-23 ENCOUNTER — ANESTHESIA (OUTPATIENT)
Dept: OPERATING ROOM | Facility: HOSPITAL | Age: 79
Setting detail: HOSPITAL OUTPATIENT SURGERY
End: 2024-01-23
Payer: COMMERCIAL

## 2024-01-23 DIAGNOSIS — N40.1 BENIGN PROSTATIC HYPERPLASIA WITH LOWER URINARY TRACT SYMPTOMS, SYMPTOM DETAILS UNSPECIFIED: ICD-10-CM

## 2024-01-23 DIAGNOSIS — N21.0 BLADDER STONE: ICD-10-CM

## 2024-01-23 LAB
ANION GAP SERPL CALC-SCNC: 6 MEQ/L (ref 3–15)
ATRIAL RATE: 69
BASOPHILS # BLD: 0.04 K/UL (ref 0.01–0.1)
BASOPHILS NFR BLD: 0.4 %
BUN SERPL-MCNC: 18 MG/DL (ref 7–25)
CALCIUM SERPL-MCNC: 7.9 MG/DL (ref 8.6–10.3)
CHLORIDE SERPL-SCNC: 105 MEQ/L (ref 98–107)
CO2 SERPL-SCNC: 25 MEQ/L (ref 21–31)
CREAT SERPL-MCNC: 1.1 MG/DL (ref 0.7–1.3)
DIFFERENTIAL METHOD BLD: ABNORMAL
EGFRCR SERPLBLD CKD-EPI 2021: >60 ML/MIN/1.73M*2
EOSINOPHIL # BLD: 0.04 K/UL (ref 0.04–0.54)
EOSINOPHIL NFR BLD: 0.4 %
ERYTHROCYTE [DISTWIDTH] IN BLOOD BY AUTOMATED COUNT: 12 % (ref 11.6–14.4)
GLUCOSE SERPL-MCNC: 181 MG/DL (ref 70–99)
HCT VFR BLD AUTO: 36 % (ref 40.1–51)
HGB BLD-MCNC: 11.8 G/DL (ref 13.7–17.5)
IMM GRANULOCYTES # BLD AUTO: 0.06 K/UL (ref 0–0.08)
IMM GRANULOCYTES NFR BLD AUTO: 0.6 %
LYMPHOCYTES # BLD: 0.82 K/UL (ref 1.2–3.5)
LYMPHOCYTES NFR BLD: 7.7 %
MAGNESIUM SERPL-MCNC: 1.7 MG/DL (ref 1.8–2.5)
MCH RBC QN AUTO: 32.2 PG (ref 28–33.2)
MCHC RBC AUTO-ENTMCNC: 32.8 G/DL (ref 32.2–36.5)
MCV RBC AUTO: 98.1 FL (ref 83–98)
MONOCYTES # BLD: 0.2 K/UL (ref 0.3–1)
MONOCYTES NFR BLD: 1.9 %
NEUTROPHILS # BLD: 9.5 K/UL (ref 1.7–7)
NEUTS SEG NFR BLD: 89 %
NRBC BLD-RTO: 0 %
P AXIS: 56
PDW BLD AUTO: 8.5 FL (ref 9.4–12.4)
PHOSPHATE SERPL-MCNC: 2.4 MG/DL (ref 2.4–4.7)
PLATELET # BLD AUTO: 223 K/UL (ref 150–350)
POTASSIUM SERPL-SCNC: 3.9 MEQ/L (ref 3.5–5.1)
PR INTERVAL: 156
QRS DURATION: 118
QT INTERVAL: 456
QTC CALCULATION(BAZETT): 488
R AXIS: -41
RBC # BLD AUTO: 3.67 M/UL (ref 4.5–5.8)
SODIUM SERPL-SCNC: 136 MEQ/L (ref 136–145)
T WAVE AXIS: -43
VENTRICULAR RATE: 69
WBC # BLD AUTO: 10.66 K/UL (ref 3.8–10.5)

## 2024-01-23 PROCEDURE — 63600000 HC DRUGS/DETAIL CODE: Mod: JZ

## 2024-01-23 PROCEDURE — 37000001 HC ANESTHESIA GENERAL: Performed by: UROLOGY

## 2024-01-23 PROCEDURE — 63700000 HC SELF-ADMINISTRABLE DRUG: Performed by: STUDENT IN AN ORGANIZED HEALTH CARE EDUCATION/TRAINING PROGRAM

## 2024-01-23 PROCEDURE — 25800000 HC PHARMACY IV SOLUTIONS: Performed by: UROLOGY

## 2024-01-23 PROCEDURE — 83735 ASSAY OF MAGNESIUM: CPT | Performed by: STUDENT IN AN ORGANIZED HEALTH CARE EDUCATION/TRAINING PROGRAM

## 2024-01-23 PROCEDURE — 93005 ELECTROCARDIOGRAM TRACING: CPT | Mod: 59 | Performed by: UROLOGY

## 2024-01-23 PROCEDURE — 36415 COLL VENOUS BLD VENIPUNCTURE: CPT | Performed by: UROLOGY

## 2024-01-23 PROCEDURE — 71000001 HC PACU PHASE 1 INITIAL 30MIN: Performed by: UROLOGY

## 2024-01-23 PROCEDURE — 36000003 HC OR LEVEL 3 INITIAL 30MIN: Performed by: UROLOGY

## 2024-01-23 PROCEDURE — 84100 ASSAY OF PHOSPHORUS: CPT | Performed by: STUDENT IN AN ORGANIZED HEALTH CARE EDUCATION/TRAINING PROGRAM

## 2024-01-23 PROCEDURE — 71000011 HC PACU PHASE 1 EA ADDL MIN: Performed by: UROLOGY

## 2024-01-23 PROCEDURE — 93005 ELECTROCARDIOGRAM TRACING: CPT | Mod: 59 | Performed by: STUDENT IN AN ORGANIZED HEALTH CARE EDUCATION/TRAINING PROGRAM

## 2024-01-23 PROCEDURE — 25800000 HC PHARMACY IV SOLUTIONS: Performed by: STUDENT IN AN ORGANIZED HEALTH CARE EDUCATION/TRAINING PROGRAM

## 2024-01-23 PROCEDURE — 85025 COMPLETE CBC W/AUTO DIFF WBC: CPT | Performed by: UROLOGY

## 2024-01-23 PROCEDURE — 0VB08ZZ EXCISION OF PROSTATE, VIA NATURAL OR ARTIFICIAL OPENING ENDOSCOPIC: ICD-10-PCS | Performed by: UROLOGY

## 2024-01-23 PROCEDURE — 36000013 HC OR LEVEL 3 EA ADDL MIN: Performed by: UROLOGY

## 2024-01-23 PROCEDURE — 63600000 HC DRUGS/DETAIL CODE: Mod: JZ | Performed by: STUDENT IN AN ORGANIZED HEALTH CARE EDUCATION/TRAINING PROGRAM

## 2024-01-23 PROCEDURE — 63600000 HC DRUGS/DETAIL CODE: Mod: JZ | Performed by: NURSE ANESTHETIST, CERTIFIED REGISTERED

## 2024-01-23 PROCEDURE — 25000000 HC PHARMACY GENERAL: Performed by: NURSE ANESTHETIST, CERTIFIED REGISTERED

## 2024-01-23 PROCEDURE — 25000000 HC PHARMACY GENERAL: Performed by: UROLOGY

## 2024-01-23 PROCEDURE — 82365 CALCULUS SPECTROSCOPY: CPT | Performed by: UROLOGY

## 2024-01-23 PROCEDURE — 0TCB8ZZ EXTIRPATION OF MATTER FROM BLADDER, VIA NATURAL OR ARTIFICIAL OPENING ENDOSCOPIC: ICD-10-PCS | Performed by: UROLOGY

## 2024-01-23 PROCEDURE — 80048 BASIC METABOLIC PNL TOTAL CA: CPT | Performed by: UROLOGY

## 2024-01-23 PROCEDURE — 88342 IMHCHEM/IMCYTCHM 1ST ANTB: CPT | Performed by: UROLOGY

## 2024-01-23 PROCEDURE — 27200000 HC STERILE SUPPLY: Performed by: UROLOGY

## 2024-01-23 RX ORDER — FINASTERIDE 5 MG/1
5 TABLET, FILM COATED ORAL DAILY
Status: DISCONTINUED | OUTPATIENT
Start: 2024-01-24 | End: 2024-01-24 | Stop reason: HOSPADM

## 2024-01-23 RX ORDER — SODIUM,POTASSIUM PHOSPHATES 280-250MG
1 POWDER IN PACKET (EA) ORAL ONCE
Status: COMPLETED | OUTPATIENT
Start: 2024-01-23 | End: 2024-01-23

## 2024-01-23 RX ORDER — DEXTROSE 50 % IN WATER (D50W) INTRAVENOUS SYRINGE
25 AS NEEDED
Status: DISCONTINUED | OUTPATIENT
Start: 2024-01-23 | End: 2024-01-24 | Stop reason: HOSPADM

## 2024-01-23 RX ORDER — LIDOCAINE HYDROCHLORIDE 20 MG/ML
JELLY TOPICAL
Status: DISCONTINUED | OUTPATIENT
Start: 2024-01-23 | End: 2024-01-23 | Stop reason: HOSPADM

## 2024-01-23 RX ORDER — ONDANSETRON HYDROCHLORIDE 2 MG/ML
4 INJECTION, SOLUTION INTRAVENOUS
Status: DISCONTINUED | OUTPATIENT
Start: 2024-01-23 | End: 2024-01-23 | Stop reason: HOSPADM

## 2024-01-23 RX ORDER — SODIUM CHLORIDE 9 MG/ML
INJECTION, SOLUTION INTRAVENOUS CONTINUOUS
Status: DISCONTINUED | OUTPATIENT
Start: 2024-01-23 | End: 2024-01-24

## 2024-01-23 RX ORDER — DEXAMETHASONE SODIUM PHOSPHATE 4 MG/ML
INJECTION, SOLUTION INTRA-ARTICULAR; INTRALESIONAL; INTRAMUSCULAR; INTRAVENOUS; SOFT TISSUE AS NEEDED
Status: DISCONTINUED | OUTPATIENT
Start: 2024-01-23 | End: 2024-01-23 | Stop reason: SURG

## 2024-01-23 RX ORDER — PROPOFOL 10 MG/ML
INJECTION, EMULSION INTRAVENOUS AS NEEDED
Status: DISCONTINUED | OUTPATIENT
Start: 2024-01-23 | End: 2024-01-23 | Stop reason: SURG

## 2024-01-23 RX ORDER — IBUPROFEN 200 MG
16-32 TABLET ORAL AS NEEDED
Status: DISCONTINUED | OUTPATIENT
Start: 2024-01-23 | End: 2024-01-23 | Stop reason: HOSPADM

## 2024-01-23 RX ORDER — OXYCODONE HYDROCHLORIDE 5 MG/1
5 TABLET ORAL EVERY 4 HOURS PRN
Status: DISCONTINUED | OUTPATIENT
Start: 2024-01-23 | End: 2024-01-24 | Stop reason: HOSPADM

## 2024-01-23 RX ORDER — EPHEDRINE SULFATE/0.9% NACL/PF 50 MG/5 ML
SYRINGE (ML) INTRAVENOUS AS NEEDED
Status: DISCONTINUED | OUTPATIENT
Start: 2024-01-23 | End: 2024-01-23 | Stop reason: SURG

## 2024-01-23 RX ORDER — DEXTROSE 40 %
15-30 GEL (GRAM) ORAL AS NEEDED
Status: DISCONTINUED | OUTPATIENT
Start: 2024-01-23 | End: 2024-01-24 | Stop reason: HOSPADM

## 2024-01-23 RX ORDER — ONDANSETRON HYDROCHLORIDE 2 MG/ML
4 INJECTION, SOLUTION INTRAVENOUS EVERY 8 HOURS PRN
Status: DISCONTINUED | OUTPATIENT
Start: 2024-01-23 | End: 2024-01-24 | Stop reason: HOSPADM

## 2024-01-23 RX ORDER — LIDOCAINE HYDROCHLORIDE 10 MG/ML
INJECTION, SOLUTION EPIDURAL; INFILTRATION; INTRACAUDAL; PERINEURAL AS NEEDED
Status: DISCONTINUED | OUTPATIENT
Start: 2024-01-23 | End: 2024-01-23 | Stop reason: SURG

## 2024-01-23 RX ORDER — HYDROMORPHONE HYDROCHLORIDE 1 MG/ML
INJECTION, SOLUTION INTRAMUSCULAR; INTRAVENOUS; SUBCUTANEOUS AS NEEDED
Status: DISCONTINUED | OUTPATIENT
Start: 2024-01-23 | End: 2024-01-23 | Stop reason: SURG

## 2024-01-23 RX ORDER — IBUPROFEN 200 MG
16-32 TABLET ORAL AS NEEDED
Status: DISCONTINUED | OUTPATIENT
Start: 2024-01-23 | End: 2024-01-24 | Stop reason: HOSPADM

## 2024-01-23 RX ORDER — OXYCODONE HYDROCHLORIDE 5 MG/1
5 TABLET ORAL ONCE AS NEEDED
Status: DISCONTINUED | OUTPATIENT
Start: 2024-01-23 | End: 2024-01-23

## 2024-01-23 RX ORDER — LEVOFLOXACIN 5 MG/ML
INJECTION, SOLUTION INTRAVENOUS
Status: COMPLETED
Start: 2024-01-23 | End: 2024-01-23

## 2024-01-23 RX ORDER — HEPARIN SODIUM 5000 [USP'U]/ML
5000 INJECTION, SOLUTION INTRAVENOUS; SUBCUTANEOUS EVERY 8 HOURS
Status: DISCONTINUED | OUTPATIENT
Start: 2024-01-24 | End: 2024-01-24 | Stop reason: HOSPADM

## 2024-01-23 RX ORDER — DEXTROSE 40 %
15-30 GEL (GRAM) ORAL AS NEEDED
Status: DISCONTINUED | OUTPATIENT
Start: 2024-01-23 | End: 2024-01-23 | Stop reason: HOSPADM

## 2024-01-23 RX ORDER — LEVOFLOXACIN 5 MG/ML
500 INJECTION, SOLUTION INTRAVENOUS
Status: COMPLETED | OUTPATIENT
Start: 2024-01-23 | End: 2024-01-23

## 2024-01-23 RX ORDER — DEXTROSE 50 % IN WATER (D50W) INTRAVENOUS SYRINGE
25 AS NEEDED
Status: DISCONTINUED | OUTPATIENT
Start: 2024-01-23 | End: 2024-01-23 | Stop reason: HOSPADM

## 2024-01-23 RX ORDER — SODIUM CHLORIDE, SODIUM GLUCONATE, SODIUM ACETATE, POTASSIUM CHLORIDE AND MAGNESIUM CHLORIDE 30; 37; 368; 526; 502 MG/100ML; MG/100ML; MG/100ML; MG/100ML; MG/100ML
125 INJECTION, SOLUTION INTRAVENOUS CONTINUOUS
Status: DISCONTINUED | OUTPATIENT
Start: 2024-01-23 | End: 2024-01-24

## 2024-01-23 RX ORDER — ACETAMINOPHEN 325 MG/1
975 TABLET ORAL EVERY 6 HOURS
Status: DISCONTINUED | OUTPATIENT
Start: 2024-01-23 | End: 2024-01-24 | Stop reason: HOSPADM

## 2024-01-23 RX ORDER — FENTANYL CITRATE 50 UG/ML
INJECTION, SOLUTION INTRAMUSCULAR; INTRAVENOUS AS NEEDED
Status: DISCONTINUED | OUTPATIENT
Start: 2024-01-23 | End: 2024-01-23 | Stop reason: SURG

## 2024-01-23 RX ORDER — ACETAMINOPHEN 325 MG/1
650 TABLET ORAL ONCE AS NEEDED
Status: DISCONTINUED | OUTPATIENT
Start: 2024-01-23 | End: 2024-01-23

## 2024-01-23 RX ORDER — ONDANSETRON HYDROCHLORIDE 2 MG/ML
INJECTION, SOLUTION INTRAVENOUS AS NEEDED
Status: DISCONTINUED | OUTPATIENT
Start: 2024-01-23 | End: 2024-01-23 | Stop reason: SURG

## 2024-01-23 RX ORDER — ONDANSETRON 4 MG/1
4 TABLET, ORALLY DISINTEGRATING ORAL EVERY 8 HOURS PRN
Status: DISCONTINUED | OUTPATIENT
Start: 2024-01-23 | End: 2024-01-24 | Stop reason: HOSPADM

## 2024-01-23 RX ORDER — TAMSULOSIN HYDROCHLORIDE 0.4 MG/1
0.4 CAPSULE ORAL DAILY
Status: DISCONTINUED | OUTPATIENT
Start: 2024-01-24 | End: 2024-01-24 | Stop reason: HOSPADM

## 2024-01-23 RX ORDER — POLYETHYLENE GLYCOL 3350 17 G/17G
17 POWDER, FOR SOLUTION ORAL DAILY
Status: DISCONTINUED | OUTPATIENT
Start: 2024-01-23 | End: 2024-01-24 | Stop reason: HOSPADM

## 2024-01-23 RX ORDER — HYDROMORPHONE HYDROCHLORIDE 1 MG/ML
0.5 INJECTION, SOLUTION INTRAMUSCULAR; INTRAVENOUS; SUBCUTANEOUS
Status: DISCONTINUED | OUTPATIENT
Start: 2024-01-23 | End: 2024-01-23 | Stop reason: HOSPADM

## 2024-01-23 RX ORDER — LEVOFLOXACIN 500 MG/1
500 TABLET, FILM COATED ORAL DAILY
Status: DISCONTINUED | OUTPATIENT
Start: 2024-01-24 | End: 2024-01-24

## 2024-01-23 RX ORDER — FENTANYL CITRATE 50 UG/ML
25 INJECTION, SOLUTION INTRAMUSCULAR; INTRAVENOUS EVERY 5 MIN PRN
Status: DISCONTINUED | OUTPATIENT
Start: 2024-01-23 | End: 2024-01-23 | Stop reason: HOSPADM

## 2024-01-23 RX ADMIN — POTASSIUM & SODIUM PHOSPHATES POWDER PACK 280-160-250 MG 1 PACKET: 280-160-250 PACK at 12:11

## 2024-01-23 RX ADMIN — FENTANYL CITRATE 50 MCG: 50 INJECTION INTRAMUSCULAR; INTRAVENOUS at 07:55

## 2024-01-23 RX ADMIN — HYDROMORPHONE HYDROCHLORIDE 0.5 MG: 1 INJECTION, SOLUTION INTRAMUSCULAR; INTRAVENOUS; SUBCUTANEOUS at 09:19

## 2024-01-23 RX ADMIN — Medication 10 MG: at 08:30

## 2024-01-23 RX ADMIN — OXYCODONE HYDROCHLORIDE 5 MG: 5 TABLET ORAL at 11:34

## 2024-01-23 RX ADMIN — DEXAMETHASONE SODIUM PHOSPHATE 4 MG: 4 INJECTION, SOLUTION INTRA-ARTICULAR; INTRALESIONAL; INTRAMUSCULAR; INTRAVENOUS; SOFT TISSUE at 07:47

## 2024-01-23 RX ADMIN — MAGNESIUM SULFATE HEPTAHYDRATE 2 G: 40 INJECTION, SOLUTION INTRAVENOUS at 12:09

## 2024-01-23 RX ADMIN — ONDANSETRON 4 MG: 2 INJECTION INTRAMUSCULAR; INTRAVENOUS at 07:47

## 2024-01-23 RX ADMIN — Medication 5 MG: at 07:44

## 2024-01-23 RX ADMIN — SODIUM CHLORIDE: 9 INJECTION, SOLUTION INTRAVENOUS at 12:03

## 2024-01-23 RX ADMIN — Medication 5 MG: at 07:33

## 2024-01-23 RX ADMIN — FENTANYL CITRATE 50 MCG: 50 INJECTION INTRAMUSCULAR; INTRAVENOUS at 08:16

## 2024-01-23 RX ADMIN — ACETAMINOPHEN 975 MG: 325 TABLET ORAL at 14:30

## 2024-01-23 RX ADMIN — LIDOCAINE HYDROCHLORIDE 5 ML: 10 INJECTION, SOLUTION EPIDURAL; INFILTRATION; INTRACAUDAL; PERINEURAL at 07:27

## 2024-01-23 RX ADMIN — PROPOFOL 30 MG: 10 INJECTION, EMULSION INTRAVENOUS at 08:40

## 2024-01-23 RX ADMIN — POLYETHYLENE GLYCOL (3350) 17 G: 17 POWDER, FOR SOLUTION ORAL at 14:53

## 2024-01-23 RX ADMIN — FENTANYL CITRATE 50 MCG: 50 INJECTION INTRAMUSCULAR; INTRAVENOUS at 09:01

## 2024-01-23 RX ADMIN — LEVOFLOXACIN 500 MG: 500 INJECTION, SOLUTION INTRAVENOUS at 06:17

## 2024-01-23 RX ADMIN — FENTANYL CITRATE 50 MCG: 50 INJECTION INTRAMUSCULAR; INTRAVENOUS at 08:46

## 2024-01-23 RX ADMIN — LEVOFLOXACIN 500 MG: 5 INJECTION, SOLUTION INTRAVENOUS at 06:17

## 2024-01-23 RX ADMIN — Medication 5 MG: at 07:34

## 2024-01-23 RX ADMIN — ACETAMINOPHEN 975 MG: 325 TABLET ORAL at 20:42

## 2024-01-23 RX ADMIN — PROPOFOL 150 MG: 10 INJECTION, EMULSION INTRAVENOUS at 07:27

## 2024-01-23 RX ADMIN — Medication 5 MG: at 07:36

## 2024-01-23 RX ADMIN — Medication 5 MG: at 08:02

## 2024-01-23 RX ADMIN — FENTANYL CITRATE 50 MCG: 50 INJECTION INTRAMUSCULAR; INTRAVENOUS at 07:27

## 2024-01-23 RX ADMIN — FENTANYL CITRATE 50 MCG: 50 INJECTION INTRAMUSCULAR; INTRAVENOUS at 08:10

## 2024-01-23 RX ADMIN — SODIUM CHLORIDE: 9 INJECTION, SOLUTION INTRAVENOUS at 06:17

## 2024-01-23 ASSESSMENT — PAIN SCALES - GENERAL: PAIN_LEVEL: 4

## 2024-01-23 ASSESSMENT — COGNITIVE AND FUNCTIONAL STATUS - GENERAL
CLIMB 3 TO 5 STEPS WITH RAILING: 3 - A LITTLE
WALKING IN HOSPITAL ROOM: 3 - A LITTLE
CLIMB 3 TO 5 STEPS WITH RAILING: 3 - A LITTLE
STANDING UP FROM CHAIR USING ARMS: 3 - A LITTLE
WALKING IN HOSPITAL ROOM: 3 - A LITTLE
MOVING TO AND FROM BED TO CHAIR: 3 - A LITTLE
MOVING TO AND FROM BED TO CHAIR: 3 - A LITTLE
STANDING UP FROM CHAIR USING ARMS: 3 - A LITTLE

## 2024-01-23 NOTE — INTERVAL H&P NOTE
Seen and examined this morning.  Answered questions for patient and his son. Ok for OR.  Ishaan Arriaga MD

## 2024-01-23 NOTE — OP NOTE
.Operative Report       Diagnosis: Bladder calculus, benign prostatic hypertrophy, prostatism    Procedure: Cystoscopy, laser lithotripsy of bladder calculus, laser assisted transurethral resection of the prostate instillation Uro-Jet, placement Lozoya catheter.    Surgeon: Ishaan Arriaga MD     Assistant: Nicholas Santo MD    Complications:  None           Drains: General, LMA    Estimated Blood Loss: Minimal           Disposition: Tolerated procedure well moved to the recovery room in good condition    History:.  This is a 78-year-old man with known prostatism who had been doing well on medical therapy however after some years of success he had gross hematuria and clot retention.  He presented to a local hospital in [place] and apparently underwent some sort of prostate procedure.  We do not have the records.  He had a stormy time thereafter and it has been very difficult for him to urinate effectively.  That hospital admission required blood transfusions.  He was told he had a large prostate gland and that he would need more work done.  The patient was evaluated by me.  He has a very large prostate on his CAT scan.  We know that he also has 1 good sized bladder stone and multiple others.    Now that the patient is back to normal hemoglobin from his acute bleeding in [place].  He is scheduled for surgery here.    The usual benefits, risks, and complications have been previously explained and agreed.    Findings: The patient fortunately has no urethral stricture.  He had a prominent middle lobe.  There is definite hypertrophy of the lateral lobes with the left side crossing the midline and the right side also obstructed but seem to have had some level of resection.    In the bladder was a poor healing area near the trigone that had stone debris on it.  Free-floating in the bladder was a stone about 1.5 cm and several smaller stones on the bladder floor.  The rest of the bladder had mild wall hypertrophy.        Procedure description: I met the patient preoperatively he was identified and consent is obtained again.  He was moved to the operating room where anesthesia was induced and he was placed in the lithotomy position.  Cystoscopic inspection with a 22 Hungarian Stortz cystoscope was employed with findings noted as above.  The ureteral orifice ease were identified and somewhat superiorly deviated.  There were well away from the intended area of operative resection.  The left gland seem to have no resection performed on Adderall and was crossing the midline substantially.  The right gland was also trying to cross the midline but has had some previous resection I think.    24 Hungarian Felicity sheath with visual obturator was used to enter the bladder and a laser bridge was employed.  Using holmium laser power stone was fragmented into multiple small pieces and irrigated out.  There was no damage done to the bladder mucosa and it was felt safe that we could move forward with transurethral resection of the prostate at least on the right side so as to get the patient voiding more comfortably.    Resectoscope was then positioned and transurethral resection of the right lobe of the prostate gland was performed and sections.  The more anterior resection of the lateral lobe was first resected so as to allow the remainder of the lobe to fall into place and have easier resection.  This was successful.  The left side was then addressed in a similar manner and required substantially more work.    At the end of resection all fragments were irrigated out.  The patient has a thin region on the left lateral wall near the bladder neck was not actively bleeding after cautery but will be an area for us to compress if the patient has postoperative bleeding.    Cystoscopy shows no obvious visible fragments of stone or prostate tissue.  Ureteral orifice ease are well beyond the region of resection and are fine.  The sphincter is inspected and  uninvolved in the region of resection.  The Aiyana is intact.    Lozoya catheter was then positioned and this irrigates clear to light pink and then clear.  It does not require traction.    Patient is then moved to the recovery room in stable condition with the intention of discharge tomorrow if all is well and he will follow-up in the office for catheter removal on Friday.  MD Ishaan Valles MD  Phone Number: [phone]

## 2024-01-23 NOTE — ANESTHESIA POSTPROCEDURE EVALUATION
Patient: Tam Zuñiga    Procedure Summary     Date: 01/23/24 Room / Location: [facility] PAV OR 09 / [facility] OR \[facility]\""    Anesthesia Start: 0724 Anesthesia Stop: 0958    Procedures:       Cystoscopy, Transurethral Resection Of Prostate TURP LASER With ND Yag Laser      Possible Cystolitholapaxy With High Power Holmium Laser(#[id number]) Diagnosis:       Benign prostatic hyperplasia with lower urinary tract symptoms, symptom details unspecified      Bladder stone      (Benign prostatic hyperplasia with lower urinary tract symptoms, symptom details unspecified [N40.1])      (Bladder stone [N21.0])    Surgeons: Ishaan Arriaga MD Responsible Provider: Saúl Rodas MD    Anesthesia Type: general ASA Status: 2          Anesthesia Type: general  PACU Vitals  1/23/2024 0947 - 1/23/2024 0958      1/23/2024  0954             BP: 124/60    Temp: 35.9 °C (96.6 °F)    Pulse: 88    Resp: 16    SpO2: 96 %            Anesthesia Post Evaluation    Pain score: 4  Pain management: satisfactory to patient  Mode of pain management: IV medication  Patient location during evaluation: PACU  Patient participation: complete - patient participated  Level of consciousness: awake  Cardiovascular status: acceptable  Airway Patency: adequate  Respiratory status: acceptable  Hydration status: stable  Anesthetic complications: no

## 2024-01-23 NOTE — ANESTHESIA PROCEDURE NOTES
Airway  Urgency: elective    Start Time: 1/23/2024 7:29 AM  Airway not difficult    General Information and Staff    Patient location during procedure: OR  Anesthesiologist: Saúl Rodas MD  Resident/CRNA: Talisha Hahn CRNA  Performed: resident/CRNA   Performed by: Talisha Hahn CRNA  Authorized by: Saúl Rodas MD      Indications and Patient Condition  Indications for airway management: anesthesia  Sedation level: deep  Preoxygenated: yes  Patient position: sniffing  MILS maintained throughout  Mask difficulty assessment: 0 - not attempted    Final Airway Details  Final airway type: supraglottic airway      Successful airway: classic  Size 4     Number of attempts at approach: 1  Ventilation between attempts: none  Number of other approaches attempted: 0  Atraumatic airway insertion

## 2024-01-23 NOTE — OR SURGEON
Pre-Procedure patient identification:  I am the primary operating surgeon/proceduralist. I have identified the patient on 01/23/24 at 7:02 AM Ishaan Arriaga MD  Phone Number: [phone]

## 2024-01-23 NOTE — ANESTHESIOLOGIST PRE-PROCEDURE ATTESTATION
Pre-Procedure Patient Identification:  I am the Primary Anesthesiologist and have identified the patient on 01/23/24 at 7:05 AM.   I have confirmed the procedure(s) will be performed by the following surgeon/proceduralist Ishaan Arriaga MD.

## 2024-01-24 VITALS
BODY MASS INDEX: 25.9 KG/M2 | WEIGHT: 165 LBS | HEIGHT: 67 IN | TEMPERATURE: 97.6 F | DIASTOLIC BLOOD PRESSURE: 65 MMHG | RESPIRATION RATE: 18 BRPM | HEART RATE: 74 BPM | SYSTOLIC BLOOD PRESSURE: 136 MMHG | OXYGEN SATURATION: 98 %

## 2024-01-24 LAB
ANION GAP SERPL CALC-SCNC: 4 MEQ/L (ref 3–15)
ATRIAL RATE: 67
BASOPHILS # BLD: 0.03 K/UL (ref 0.01–0.1)
BASOPHILS NFR BLD: 0.4 %
BUN SERPL-MCNC: 15 MG/DL (ref 7–25)
CALCIUM SERPL-MCNC: 7.7 MG/DL (ref 8.6–10.3)
CHLORIDE SERPL-SCNC: 109 MEQ/L (ref 98–107)
CO2 SERPL-SCNC: 25 MEQ/L (ref 21–31)
CREAT SERPL-MCNC: 0.9 MG/DL (ref 0.7–1.3)
DIFFERENTIAL METHOD BLD: ABNORMAL
EGFRCR SERPLBLD CKD-EPI 2021: >60 ML/MIN/1.73M*2
EOSINOPHIL # BLD: 0.11 K/UL (ref 0.04–0.54)
EOSINOPHIL NFR BLD: 1.4 %
ERYTHROCYTE [DISTWIDTH] IN BLOOD BY AUTOMATED COUNT: 12.1 % (ref 11.6–14.4)
GLUCOSE SERPL-MCNC: 101 MG/DL (ref 70–99)
HCT VFR BLD AUTO: 32.1 % (ref 40.1–51)
HGB BLD-MCNC: 10.2 G/DL (ref 13.7–17.5)
IMM GRANULOCYTES # BLD AUTO: 0.03 K/UL (ref 0–0.08)
IMM GRANULOCYTES NFR BLD AUTO: 0.4 %
LYMPHOCYTES # BLD: 1.3 K/UL (ref 1.2–3.5)
LYMPHOCYTES NFR BLD: 16.8 %
MCH RBC QN AUTO: 31.6 PG (ref 28–33.2)
MCHC RBC AUTO-ENTMCNC: 31.8 G/DL (ref 32.2–36.5)
MCV RBC AUTO: 99.4 FL (ref 83–98)
MONOCYTES # BLD: 0.66 K/UL (ref 0.3–1)
MONOCYTES NFR BLD: 8.5 %
NEUTROPHILS # BLD: 5.63 K/UL (ref 1.7–7)
NEUTS SEG NFR BLD: 72.5 %
NRBC BLD-RTO: 0 %
P AXIS: 56
PDW BLD AUTO: 9 FL (ref 9.4–12.4)
PLATELET # BLD AUTO: 211 K/UL (ref 150–350)
POTASSIUM SERPL-SCNC: 4.1 MEQ/L (ref 3.5–5.1)
PR INTERVAL: 156
QRS DURATION: 118
QT INTERVAL: 452
QTC CALCULATION(BAZETT): 478
R AXIS: -40
RBC # BLD AUTO: 3.23 M/UL (ref 4.5–5.8)
SODIUM SERPL-SCNC: 138 MEQ/L (ref 136–145)
T WAVE AXIS: -37
VENTRICULAR RATE: 67
WBC # BLD AUTO: 7.76 K/UL (ref 3.8–10.5)

## 2024-01-24 PROCEDURE — 63600000 HC DRUGS/DETAIL CODE: Mod: JZ | Performed by: STUDENT IN AN ORGANIZED HEALTH CARE EDUCATION/TRAINING PROGRAM

## 2024-01-24 PROCEDURE — 63700000 HC SELF-ADMINISTRABLE DRUG: Performed by: STUDENT IN AN ORGANIZED HEALTH CARE EDUCATION/TRAINING PROGRAM

## 2024-01-24 PROCEDURE — 85025 COMPLETE CBC W/AUTO DIFF WBC: CPT | Performed by: UROLOGY

## 2024-01-24 PROCEDURE — 25800000 HC PHARMACY IV SOLUTIONS: Performed by: STUDENT IN AN ORGANIZED HEALTH CARE EDUCATION/TRAINING PROGRAM

## 2024-01-24 PROCEDURE — 80048 BASIC METABOLIC PNL TOTAL CA: CPT | Performed by: UROLOGY

## 2024-01-24 PROCEDURE — 36415 COLL VENOUS BLD VENIPUNCTURE: CPT | Performed by: UROLOGY

## 2024-01-24 RX ORDER — SULFAMETHOXAZOLE AND TRIMETHOPRIM 800; 160 MG/1; MG/1
1 TABLET ORAL EVERY 12 HOURS
Qty: 6 TABLET | Refills: 0 | Status: SHIPPED | OUTPATIENT
Start: 2024-01-24 | End: 2024-01-27

## 2024-01-24 RX ORDER — SULFAMETHOXAZOLE AND TRIMETHOPRIM 800; 160 MG/1; MG/1
1 TABLET ORAL EVERY 12 HOURS
Status: DISCONTINUED | OUTPATIENT
Start: 2024-01-24 | End: 2024-01-24 | Stop reason: HOSPADM

## 2024-01-24 RX ORDER — POLYETHYLENE GLYCOL 3350 17 G/17G
17 POWDER, FOR SOLUTION ORAL DAILY
Qty: 30 PACKET | Refills: 0 | Status: SHIPPED | OUTPATIENT
Start: 2024-01-24 | End: 2024-02-23

## 2024-01-24 RX ADMIN — SODIUM CHLORIDE: 9 INJECTION, SOLUTION INTRAVENOUS at 00:13

## 2024-01-24 RX ADMIN — SULFAMETHOXAZOLE AND TRIMETHOPRIM 1 TABLET: 800; 160 TABLET ORAL at 08:22

## 2024-01-24 RX ADMIN — ACETAMINOPHEN 975 MG: 325 TABLET ORAL at 03:00

## 2024-01-24 RX ADMIN — HEPARIN SODIUM 5000 UNITS: 5000 INJECTION, SOLUTION INTRAVENOUS; SUBCUTANEOUS at 06:00

## 2024-01-24 RX ADMIN — FINASTERIDE 5 MG: 5 TABLET, FILM COATED ORAL at 08:22

## 2024-01-24 RX ADMIN — POLYETHYLENE GLYCOL (3350) 17 G: 17 POWDER, FOR SOLUTION ORAL at 08:23

## 2024-01-24 RX ADMIN — ACETAMINOPHEN 975 MG: 325 TABLET ORAL at 08:22

## 2024-01-24 ASSESSMENT — COGNITIVE AND FUNCTIONAL STATUS - GENERAL
WALKING IN HOSPITAL ROOM: 4 - NONE
MOVING TO AND FROM BED TO CHAIR: 4 - NONE
CLIMB 3 TO 5 STEPS WITH RAILING: 4 - NONE
STANDING UP FROM CHAIR USING ARMS: 4 - NONE

## 2024-01-24 NOTE — DISCHARGE INSTRUCTIONS
DISCHARGE INSTRUCTIONS - TURP     ACTIVITY:  You should be physically active and try to do a little more each day to build your stamina.  You may walk and climb stairs without limitations.  You may not lift more than 15 pounds, or do any vigorous physical activity for 1 week (running, swimming, gym, golf, tennis, etc.).    Prolonged sitting or lying in bed should be avoided  DRIVING:  You should not drive if taking narcotic pain killers or for 24 hours after your procedure  PAIN:  You can expect to have some pain for a few days after discharge. Tylenol should be sufficient to control your pain.  Burning and bleeding with urination are common after this procedure.  It will gradually become less intense after first few times you pass your urine.  MEDICATIONS:  After surgery you should immediately resume your regular medications.  You will receive the following prescriptions:  An antibiotic: please take Bactrim twice daily until date of sherman catheter removal  A stool softener: take miralax daily to prevent constipation  DIET AND BOWELS:  Drink plenty of fluids during the day.  Water is the best, but juices, coffee, tea are all acceptable.  The purpose is to increase the urine output enough to flush out any blood.  If needed, you may use over the counter stool softener (such as miralax) to help your bowels get started.     CATHETER CARE:  You will be discharged with a catheter, a leg bag and a large (overnight) drainage bag.    Always keep the bag below the level of the bladder - the catheter drains by gravity.  Always make sure the catheter is not being pulled on - we don’t want the catheter to be removed accidentally.  Blood in the urine is common and can be seen for a few weeks after surgery.  If you do see blood in the urine, please drink plenty of water (to flush it out) and limit your activity slightly until the catheter clears up.  Leaking around the catheter is not unusual.  It commonly happens during a bowel  movement or with bladder spasms.  Bladder spasms cause a sudden sensation that you need to urinate, crampy pain and a sudden flow of urine into and around the catheter.  Bladder spasms are uncomfortable, but harmless.  You may take the catheter and bag into the shower with you.  Any material on the outside of the catheter can be washed off - please be sure to rinse any soap off completely to avoid irritation.     WHEN TO CALL US:  Temperature of 101.0 degrees or above (fevers <101 may be normal after surgery).  Severe pain - not relieved with pain medication, especially if it is associated with nausea, vomiting, or dizziness  If your catheter stops draining for several hours.  If you have significant bleeding in the catheter with blood clots.     APPOINTMENTS:  Follow up with Dr. Arriaga on Friday for sherman catheter removal as scheduled. If you need to make or change your appointment, please call the office at [phone].  Interval appointments as needed

## 2024-01-24 NOTE — PROGRESS NOTES
Urology Daily Progress Note    Subjective     Interval History: POD1 from cystolitholapaxy and TURP. Urine this morning is clear on slow-moderate CBI. He is resting comfortably without complaint. Tolerating diet, passing flatus. He has not ambulated since surgery.      Objective     Vital signs in last 24 hours:  Temp:  [35.7 °C (96.3 °F)-37 °C (98.6 °F)] 36.4 °C (97.6 °F)  Heart Rate:  [64-88] 65  Resp:  [16-46] 16  BP: (101-129)/(52-62) 101/52      Intake/Output Summary (Last 24 hours) at 1/24/2024 0744  Last data filed at 1/24/2024 0424  Gross per 24 hour   Intake 1600 ml   Output 87097 ml   Net -68528 ml     Intake/Output this shift:  No intake/output data recorded.    Labs  Results from last 7 days   Lab Units 01/24/24  0424 01/23/24  1015   WBC K/uL 7.76 10.66*   HEMOGLOBIN g/dL 10.2* 11.8*   HEMATOCRIT % 32.1* 36.0*   PLATELETS K/uL 211 223     Results from last 7 days   Lab Units 01/24/24  0424 01/23/24  1015   SODIUM mEQ/L 138 136   POTASSIUM mEQ/L 4.1 3.9   CHLORIDE mEQ/L 109* 105   CO2 mEQ/L 25 25   BUN mg/dL 15 18   CREATININE mg/dL 0.9 1.1   GLUCOSE mg/dL 101* 181*   CALCIUM mg/dL 7.7* 7.9*     UA Results       10/25/18     1640    Color Red    Clarity Turbid    Glucose Proper identification not possible due to color interference.    Bilirubin Proper identification not possible due to color interference.    Ketones Proper identification not possible due to color interference.    Sp Grav 1.021    Blood +3    Ph Proper identification not possible due to color interference.    Protein Proper identification not possible due to color interference.    Urobilinogen Proper identification not possible due to color interference.    Nitrite Proper identification not possible due to color interference.    Leuk Est +2    WBC Too Numerous To Count    RBC 20 TO 35    Bacteria None Seen         Comment for Blood at 1640 on 10/25/18:   The sensitivity of the occult blood test is equivalent to approximately 4 intact  "RBC/HPF.        Microbiology Results     ** No results found for the last 720 hours. **            Imaging  Reviewed    Physicial Exam  Visit Vitals  BP (!) 101/52 (BP Location: Left upper arm, Patient Position: Lying)   Pulse 65   Temp 36.4 °C (97.6 °F) (Temporal)   Resp 16   Ht 1.702 m (5' 7\")   Wt 74.8 kg (165 lb)   SpO2 96%   BMI 25.84 kg/m²     General appearance: alert, cooperative, no acute distress  Lungs: Unlabored respirations, symmetric chest expansion  Heart: regular rate and rhythm  Abdomen: soft, non-tender to palpation, non-distended  : 22 Fr 3-way sherman with clear urine on CBI  Extremities: extremities normal, warm and well-perfused; no cyanosis, clubbing, or edema and no redness or tenderness in the calves bilaterally  Neurologic: Alert and oriented X 3       Assessment     78 year old male s/p cystolitholapaxy and TURP on 1/23.        Plan   - CBI clamped this morning. We will urine check patient later this morning.  - if urine remains acceptable color, plan for discharge with sherman until void trial on 1/26  - Bactrim for UTI prophylaxis until sherman removal   - bowel regimen, DVT prophylaxis    [facility]  Nicholas Santo MD, PGY3  [facility] Pager #[phone]  Kleber Bejarano Pager #[phone]                            "

## 2024-01-24 NOTE — PROGRESS NOTES
POD#1  TURP and bladder stone    Urine light pink.  Blood work in [facility] yesterday acceptable.  Minor anemia.  Na+ fine.  Pretty comfortable this AM.    Looks ok for dischagre.  Will take Levaquin 500 daily until sherman out.  Pt to call soon to arrange void trial Friday morning.    Ishaan Arriaga MD

## 2024-01-24 NOTE — PLAN OF CARE
Care Coordination Discharge Plan Note     Discharge Needs Assessment  Concerns to be Addressed: no discharge needs identified    Anticipated Discharge Plan  Anticipated Discharge Disposition: home with assistance       Patient Choice  Offered/Gave Vendor List: no  ---------------------------------------------------------------------------------------------------------------------    Interdisciplinary Discharge Plan Review:  Participants:advanced practice provider, , physical therapy, dietitian/nutrition services, nursing, social work/services    Discharge Plan:   Disposition/Destination: Home  / Home    Discharge Transportation:  Is Out of Hospital DNR needed at Discharge: no  Does patient need discharge transport? No    Chart reviewed and pt's plan of care discussed in interdisciplinary rounds along with bedside RN. Per rounds w/ medical team, pt has no d/c needs. Pt anticipated to d/c home today with family/friend to transport. Will follow up in outpatient office for sherman removal two days from now. No d/c needs identified at this time. SW will remain available should any concerns arise.     DCP:  Home w/ family  Family transport

## 2024-01-29 LAB
CASE RPRT: NORMAL
CLINICAL INFO: NORMAL
IMMUNE STAIN STUDY: NORMAL
PATH REPORT.FINAL DX SPEC: NORMAL
PATH REPORT.FINAL DX SPEC: NORMAL
PATH REPORT.GROSS SPEC: NORMAL

## 2024-01-31 LAB
COMPN STONE: NORMAL
ORIGIN STONE: NORMAL
WT STONE: 0.98 G

## (undated) DEVICE — Device

## (undated) DEVICE — FIBER HOLMIUM DISP 270UM

## (undated) DEVICE — SORBITOL IN WATER 3%, 3000 MLUROMATIC CONTAINER

## (undated) DEVICE — TRAY WET SKIN PREP PREMIUM

## (undated) DEVICE — SOLN IRRIG STER WATER 500ML

## (undated) DEVICE — TOOMEY SYRINGE, 70CC STERILE

## (undated) DEVICE — ELECTRODE RESECTOSCOPE LOOP

## (undated) DEVICE — HEEL  ELBOW PROTECTOR

## (undated) DEVICE — FIBER HOLMIUM 600 MICRON SINGLE USE

## (undated) DEVICE — SET IRRIGATION LEVEL I CYSTO

## (undated) DEVICE — GOWN SIRUS FABRNF RAGLAN XL ST 28/CS

## (undated) DEVICE — SOLN IRRIG STERILE WATER 3L

## (undated) DEVICE — PACK RFID CYSTOSCOPY

## (undated) DEVICE — SOLN IRRIG .9%SOD 500ML

## (undated) DEVICE — BAG URINARY DRAINAGE 4 LITER

## (undated) DEVICE — CHECK-FLOW ADAPTOR

## (undated) DEVICE — SOLN IRRIG .9%SOD 3L

## (undated) DEVICE — TUBE SUCTION 1/4INX20FT STERILE

## (undated) DEVICE — FIBER LASER HOLMIUM 600 MICRON QUARTZ TIP

## (undated) DEVICE — SYRINGE DISP LUER-LOK 30 CC

## (undated) DEVICE — TOWEL SURGICAL W17XL27IN BLUE COTTON STANDARD PREWASHED DELI

## (undated) DEVICE — GAUZE 8X4 16 PLY RFID DOUBLE XRAY

## (undated) DEVICE — GLOVE SZ 8.5 PROTEXIS PI

## (undated) DEVICE — SLEEVE SCD COMFORT KNEE LENGTH MED

## (undated) DEVICE — SOLN BETADINE 4 OZ

## (undated) DEVICE — FIBER HOLMIUM DISP 400UM

## (undated) DEVICE — GOWN SIRUS FABRNF RAGLAN 2XL ST 28/CS

## (undated) DEVICE — PAD GROUND ELECTROSURGICAL W/CORD